# Patient Record
Sex: FEMALE | Race: WHITE | URBAN - METROPOLITAN AREA
[De-identification: names, ages, dates, MRNs, and addresses within clinical notes are randomized per-mention and may not be internally consistent; named-entity substitution may affect disease eponyms.]

---

## 2019-12-21 ENCOUNTER — OFFICE VISIT (OUTPATIENT)
Dept: FAMILY MEDICINE CLINIC | Facility: CLINIC | Age: 19
End: 2019-12-21
Payer: COMMERCIAL

## 2019-12-21 VITALS
OXYGEN SATURATION: 98 % | SYSTOLIC BLOOD PRESSURE: 115 MMHG | HEIGHT: 66 IN | RESPIRATION RATE: 16 BRPM | TEMPERATURE: 98 F | WEIGHT: 135.81 LBS | HEART RATE: 71 BPM | BODY MASS INDEX: 21.83 KG/M2 | DIASTOLIC BLOOD PRESSURE: 68 MMHG

## 2019-12-21 DIAGNOSIS — H65.191 ACUTE OTITIS MEDIA WITH EFFUSION OF RIGHT EAR: Primary | ICD-10-CM

## 2019-12-21 PROCEDURE — 99203 OFFICE O/P NEW LOW 30 MIN: CPT | Performed by: NURSE PRACTITIONER

## 2019-12-21 RX ORDER — FLUTICASONE PROPIONATE 50 MCG
1 SPRAY, SUSPENSION (ML) NASAL 2 TIMES DAILY
Qty: 1 BOTTLE | Refills: 1 | Status: SHIPPED | OUTPATIENT
Start: 2019-12-21 | End: 2020-01-20

## 2019-12-21 RX ORDER — SPIRONOLACTONE 50 MG/1
TABLET, FILM COATED ORAL
COMMUNITY
Start: 2019-09-19

## 2019-12-21 NOTE — PROGRESS NOTES
HPI:   Cherie Chairez is a 22year old female who presents with ill symptoms for  1  days. Patient reports flew in from out of town to visit family and patient with right ear pain during flight.  Complains of right ear popping, sharp pain radiating to [place] CARDIO: RRR without murmur      ASSESSMENT AND PLAN:    PLAN:Cathy was seen today for ear problem.     Diagnoses and all orders for this visit:    Acute otitis media with effusion of right ear  -     Fluticasone Propionate 50 MCG/ACT Nasal Suspension; 1 spr It often takes from several weeks up to 3 months for the fluid to clear on its own. Oral pain relievers and ear drops help if there is pain. Decongestants and antihistamines sometimes help. Antibiotics don't help since there is no infection.  Your doctor ma · Fever of 100.4°F (38°C) or higher, or as directed by your healthcare provider  · Fluid or blood draining from the ear  · Headache or sinus pain  · Stiff neck  · Unusual drowsiness or confusion  Date Last Reviewed: 10/1/2016  © [id number] [employer] [facility]

## 2019-12-21 NOTE — PATIENT INSTRUCTIONS
· May take 4 hour Sudafed for the flight, take upon takeoff and landing only if needed. · See below for comfort-start Flonase as directed. · Follow up with your primary care doctor if not improved when you return home.       Earache, No Infection (Adult) have chronic liver or kidney disease or ever had a stomach ulcer or GI bleeding, talk with your doctor before using these medicines. Aspirin should never be used in anyone under 25years of age who is ill with a fever. It may cause severe liver damage.   ·